# Patient Record
Sex: MALE | Race: ASIAN | NOT HISPANIC OR LATINO | URBAN - METROPOLITAN AREA
[De-identification: names, ages, dates, MRNs, and addresses within clinical notes are randomized per-mention and may not be internally consistent; named-entity substitution may affect disease eponyms.]

---

## 2020-06-17 ENCOUNTER — TELEPHONE ENCOUNTER (OUTPATIENT)
Dept: URBAN - METROPOLITAN AREA CLINIC 98 | Facility: CLINIC | Age: 35
End: 2020-06-17

## 2020-06-17 RX ORDER — MESALAMINE 1.2 G/1
TAKE TWO TABLETS BY MOUTH DAILY TABLET, DELAYED RELEASE ORAL
Qty: 60 | Refills: 5 | Status: ACTIVE | COMMUNITY
Start: 2019-09-19 | End: 1900-01-01

## 2020-06-17 RX ORDER — MERCAPTOPURINE 50 MG/1
TAKE 1 TABLET IN THE MORNING AS DIRECTED TABLET ORAL
Qty: 90 | Refills: 3 | Status: ACTIVE | COMMUNITY
Start: 2019-11-22 | End: 2020-11-16

## 2020-06-17 RX ORDER — MESALAMINE 375 MG/1
TAKE 4 CAPSULES (1,500 MG) BY ORAL ROUTE ONCE DAILY IN THE MORNING FOR 90 DAYS CAPSULE, EXTENDED RELEASE ORAL 1
Qty: 360 | Refills: 3 | Status: ACTIVE | COMMUNITY
Start: 2019-11-22 | End: 2020-11-16

## 2020-06-17 RX ORDER — MESALAMINE 1.2 G/1
2 TABLETS TABLET, DELAYED RELEASE ORAL TWICE DAILY
Qty: 360 | Refills: 3 | OUTPATIENT
Start: 2020-06-17 | End: 2021-06-12

## 2020-07-15 ENCOUNTER — WEB ENCOUNTER (OUTPATIENT)
Dept: URBAN - METROPOLITAN AREA CLINIC 98 | Facility: CLINIC | Age: 35
End: 2020-07-15

## 2020-07-23 ENCOUNTER — OFFICE VISIT (OUTPATIENT)
Dept: URBAN - METROPOLITAN AREA TELEHEALTH 2 | Facility: TELEHEALTH | Age: 35
End: 2020-07-23
Payer: COMMERCIAL

## 2020-07-23 DIAGNOSIS — K51.00 ULCERATIVE PANCOLITIS WITHOUT COMPLICATION: ICD-10-CM

## 2020-07-23 PROCEDURE — G8427 DOCREV CUR MEDS BY ELIG CLIN: HCPCS | Performed by: INTERNAL MEDICINE

## 2020-07-23 PROCEDURE — 99214 OFFICE O/P EST MOD 30 MIN: CPT | Performed by: INTERNAL MEDICINE

## 2020-07-23 PROCEDURE — G8420 CALC BMI NORM PARAMETERS: HCPCS | Performed by: INTERNAL MEDICINE

## 2020-07-23 RX ORDER — MESALAMINE 375 MG/1
TAKE 4 CAPSULES (1,500 MG) BY ORAL ROUTE ONCE DAILY IN THE MORNING FOR 90 DAYS CAPSULE, EXTENDED RELEASE ORAL 1
Qty: 360 | Refills: 3 | Status: ACTIVE | COMMUNITY
Start: 2019-11-22 | End: 2020-11-16

## 2020-07-23 RX ORDER — MERCAPTOPURINE 50 MG/1
TAKE 1 TABLET IN THE MORNING AS DIRECTED TABLET ORAL
Qty: 90 | Refills: 3
Start: 2019-11-22

## 2020-07-23 RX ORDER — MESALAMINE 1.2 G/1
TAKE TWO TABLETS BY MOUTH DAILY TABLET, DELAYED RELEASE ORAL
Qty: 60 | Refills: 5 | Status: DISCONTINUED | COMMUNITY
Start: 2019-09-19

## 2020-07-23 RX ORDER — PREDNISONE 20 MG/1
1 TABLET TABLET ORAL
Qty: 30 | Refills: 0 | OUTPATIENT

## 2020-07-23 RX ORDER — MESALAMINE 1.2 G/1
2 TABLETS TABLET, DELAYED RELEASE ORAL TWICE DAILY
Qty: 360 | Refills: 3
Start: 2020-06-17

## 2020-07-23 RX ORDER — MERCAPTOPURINE 50 MG/1
TAKE 1 TABLET IN THE MORNING AS DIRECTED TABLET ORAL
Qty: 90 | Refills: 3 | Status: ACTIVE | COMMUNITY
Start: 2019-11-22 | End: 2020-11-16

## 2020-07-23 RX ORDER — MESALAMINE 1.2 G/1
2 TABLETS TABLET, DELAYED RELEASE ORAL TWICE DAILY
Qty: 360 | Refills: 3 | Status: ACTIVE | COMMUNITY
Start: 2020-06-17 | End: 2021-06-12

## 2020-07-23 NOTE — HPI-OTHER HISTORIES
Long history of UC On Apriso and 6mp Flaring x 1 month diarrhea, gas urgency slight blood colonoscopy 10/2018 colitis was in remission endoscopically

## 2020-07-30 LAB
A/G RATIO: 1.8
ALBUMIN: 4.9
ALKALINE PHOSPHATASE: 57
ALT (SGPT): 15
AST (SGOT): 18
BILIRUBIN, TOTAL: 0.9
BUN/CREATININE RATIO: 13
BUN: 12
CALCIUM: 9.6
CARBON DIOXIDE, TOTAL: 25
CHLORIDE: 100
CREATININE: 0.94
EGFR IF AFRICN AM: 119
EGFR IF NONAFRICN AM: 103
GLOBULIN, TOTAL: 2.7
GLUCOSE: 111
HEMATOCRIT: 44.8
HEMOGLOBIN: 15.2
MCH: 31.1
MCHC: 33.9
MCV: 92
NRBC: (no result)
PLATELETS: 301
POTASSIUM: 3.8
PROTEIN, TOTAL: 7.6
RBC: 4.89
RDW: 15.2
SODIUM: 142
WBC: 6

## 2020-08-12 LAB — GASTROINTESTINAL PATHOGEN: (no result)

## 2021-08-09 ENCOUNTER — ERX REFILL RESPONSE (OUTPATIENT)
Dept: URBAN - METROPOLITAN AREA CLINIC 111 | Facility: CLINIC | Age: 36
End: 2021-08-09

## 2021-08-09 ENCOUNTER — TELEPHONE ENCOUNTER (OUTPATIENT)
Dept: URBAN - METROPOLITAN AREA CLINIC 98 | Facility: CLINIC | Age: 36
End: 2021-08-09

## 2021-08-09 RX ORDER — MESALAMINE 1.2 G/1
TAKE TWO TABLETS BY MOUTH TWICE A DAY TABLET, DELAYED RELEASE ORAL
Qty: 360 TABLET | Refills: 1 | OUTPATIENT

## 2021-08-09 RX ORDER — MESALAMINE 1.2 G/1
2 TABLETS TABLET, DELAYED RELEASE ORAL TWICE DAILY
Qty: 360 | Refills: 3 | OUTPATIENT

## 2021-08-13 LAB
A/G RATIO: 1.6
ALBUMIN: 4.8
ALKALINE PHOSPHATASE: 73
ALT (SGPT): 23
AST (SGOT): 21
BILIRUBIN, TOTAL: 0.6
BUN/CREATININE RATIO: 15
BUN: 11
C-REACTIVE PROTEIN, QUANT: 7
CALCIUM: 9.1
CARBON DIOXIDE, TOTAL: 24
CHLORIDE: 101
CREATININE: 0.75
EGFR IF AFRICN AM: 135
EGFR IF NONAFRICN AM: 116
GLOBULIN, TOTAL: 3
GLUCOSE: 89
HEMATOCRIT: 44.3
HEMOGLOBIN: 15.2
MCH: 29.9
MCHC: 34.3
MCV: 87
NRBC: (no result)
PLATELETS: 282
POTASSIUM: 4.1
PROTEIN, TOTAL: 7.8
RBC: 5.09
RDW: 13.6
SODIUM: 140
WBC: 3.1

## 2021-09-10 ENCOUNTER — ERX REFILL RESPONSE (OUTPATIENT)
Dept: URBAN - METROPOLITAN AREA CLINIC 111 | Facility: CLINIC | Age: 36
End: 2021-09-10

## 2021-09-10 RX ORDER — MERCAPTOPURINE 50 MG/1
TAKE ONE BY MOUTH EVERY MORNING AS DIRECTED TABLET ORAL
Qty: 30 TABLET | Refills: 1 | OUTPATIENT

## 2021-09-10 RX ORDER — MERCAPTOPURINE 50 MG/1
TAKE 1 TABLET IN THE MORNING AS DIRECTED TABLET ORAL
Qty: 90 | Refills: 3 | OUTPATIENT

## 2022-02-06 ENCOUNTER — ERX REFILL RESPONSE (OUTPATIENT)
Dept: URBAN - METROPOLITAN AREA CLINIC 111 | Facility: CLINIC | Age: 37
End: 2022-02-06

## 2022-02-06 RX ORDER — MESALAMINE 1.2 G/1
TAKE TWO TABLETS BY MOUTH TWICE A DAY TABLET, DELAYED RELEASE ORAL
Qty: 360 TABLET | Refills: 0 | OUTPATIENT

## 2022-02-06 RX ORDER — MESALAMINE 1.2 G/1
TAKE TWO TABLETS BY MOUTH TWICE A DAY TABLET, DELAYED RELEASE ORAL
Qty: 360 TABLET | Refills: 1 | OUTPATIENT

## 2022-03-31 ENCOUNTER — ERX REFILL RESPONSE (OUTPATIENT)
Dept: URBAN - METROPOLITAN AREA CLINIC 111 | Facility: CLINIC | Age: 37
End: 2022-03-31

## 2022-03-31 RX ORDER — MERCAPTOPURINE 50 MG/1
TAKE ONE BY MOUTH EVERY MORNING TABLET ORAL
Qty: 30 TABLET | Refills: 1 | OUTPATIENT

## 2022-03-31 RX ORDER — MERCAPTOPURINE 50 MG/1
TAKE ONE BY MOUTH EVERY MORNING TABLET ORAL
Qty: 30 TABLET | Refills: 0 | OUTPATIENT

## 2022-06-06 ENCOUNTER — ERX REFILL RESPONSE (OUTPATIENT)
Dept: URBAN - METROPOLITAN AREA CLINIC 111 | Facility: CLINIC | Age: 37
End: 2022-06-06

## 2022-06-06 RX ORDER — MERCAPTOPURINE 50 MG/1
TAKE ONE TABLET BY MOUTH EVERY MORNING TABLET ORAL
Qty: 30 TABLET | Refills: 1 | OUTPATIENT

## 2022-06-06 RX ORDER — MERCAPTOPURINE 50 MG/1
TAKE ONE TABLET BY MOUTH EVERY MORNING TABLET ORAL
Qty: 30 TABLET | Refills: 0 | OUTPATIENT

## 2022-06-09 ENCOUNTER — CLAIMS CREATED FROM THE CLAIM WINDOW (OUTPATIENT)
Dept: URBAN - METROPOLITAN AREA TELEHEALTH 2 | Facility: TELEHEALTH | Age: 37
End: 2022-06-09
Payer: COMMERCIAL

## 2022-06-09 DIAGNOSIS — K52.9 IBD (INFLAMMATORY BOWEL DISEASE): ICD-10-CM

## 2022-06-09 DIAGNOSIS — K51.00 ULCERATIVE PANCOLITIS WITHOUT COMPLICATION: ICD-10-CM

## 2022-06-09 PROBLEM — 444548001: Status: ACTIVE | Noted: 2022-06-09

## 2022-06-09 PROCEDURE — 99213 OFFICE O/P EST LOW 20 MIN: CPT | Performed by: INTERNAL MEDICINE

## 2022-06-09 RX ORDER — MESALAMINE 1.2 G/1
TAKE TWO TABLETS BY MOUTH TWICE A DAY TABLET, DELAYED RELEASE ORAL
Qty: 360 TABLET | Refills: 1 | Status: ACTIVE | COMMUNITY

## 2022-06-09 RX ORDER — PREDNISONE 20 MG/1
1 TABLET TABLET ORAL
Qty: 30 | Refills: 0 | Status: ACTIVE | COMMUNITY

## 2022-06-09 RX ORDER — MESALAMINE 1.2 G/1
2 TABLET, DELAYED RELEASE ORAL TWICE DAILY
Qty: 360 | Refills: 1

## 2022-06-09 RX ORDER — MERCAPTOPURINE 50 MG/1
1 TABLET ORAL DAILY
Qty: 90 | Refills: 1

## 2022-06-09 RX ORDER — MERCAPTOPURINE 50 MG/1
TAKE ONE TABLET BY MOUTH EVERY MORNING TABLET ORAL
Qty: 30 TABLET | Refills: 1 | Status: ACTIVE | COMMUNITY

## 2022-06-09 NOTE — HPI-OTHER HISTORIES
Long history of UC On Apriso and 6mp Flaring x 1 month diarrhea, gas urgency slight blood colonoscopy 10/2018 colitis was in remission endoscopically . 6/9/22 diarrhea,mucus past 3 months urgencytaking 6mp 50 mg daily mesalamine 2.4 g daily

## 2022-06-12 ENCOUNTER — WEB ENCOUNTER (OUTPATIENT)
Dept: URBAN - METROPOLITAN AREA CLINIC 98 | Facility: CLINIC | Age: 37
End: 2022-06-12

## 2022-06-13 ENCOUNTER — WEB ENCOUNTER (OUTPATIENT)
Dept: URBAN - METROPOLITAN AREA CLINIC 98 | Facility: CLINIC | Age: 37
End: 2022-06-13

## 2022-06-13 ENCOUNTER — TELEPHONE ENCOUNTER (OUTPATIENT)
Dept: URBAN - METROPOLITAN AREA CLINIC 98 | Facility: CLINIC | Age: 37
End: 2022-06-13

## 2022-06-13 RX ORDER — PREDNISONE 20 MG/1
1 TABLET TABLET ORAL
Qty: 30 | Refills: 0
End: 2022-07-03

## 2022-09-21 ENCOUNTER — OFFICE VISIT (OUTPATIENT)
Dept: URBAN - METROPOLITAN AREA SURGERY CENTER 18 | Facility: SURGERY CENTER | Age: 37
End: 2022-09-21

## 2022-11-30 ENCOUNTER — OFFICE VISIT (OUTPATIENT)
Dept: URBAN - METROPOLITAN AREA SURGERY CENTER 18 | Facility: SURGERY CENTER | Age: 37
End: 2022-11-30
Payer: COMMERCIAL

## 2022-11-30 ENCOUNTER — CLAIMS CREATED FROM THE CLAIM WINDOW (OUTPATIENT)
Dept: URBAN - METROPOLITAN AREA CLINIC 4 | Facility: CLINIC | Age: 37
End: 2022-11-30
Payer: COMMERCIAL

## 2022-11-30 DIAGNOSIS — K51.50 CHRONIC LEFT-SIDED ULCERATIVE COLITIS: ICD-10-CM

## 2022-11-30 DIAGNOSIS — K63.89 OTHER SPECIFIED DISEASES OF INTESTINE: ICD-10-CM

## 2022-11-30 PROCEDURE — 88305 TISSUE EXAM BY PATHOLOGIST: CPT | Performed by: PATHOLOGY

## 2022-11-30 PROCEDURE — 45380 COLONOSCOPY AND BIOPSY: CPT | Performed by: INTERNAL MEDICINE

## 2022-11-30 PROCEDURE — G8907 PT DOC NO EVENTS ON DISCHARG: HCPCS | Performed by: INTERNAL MEDICINE

## 2022-12-22 ENCOUNTER — ERX REFILL RESPONSE (OUTPATIENT)
Dept: URBAN - METROPOLITAN AREA CLINIC 111 | Facility: CLINIC | Age: 37
End: 2022-12-22

## 2022-12-22 RX ORDER — MERCAPTOPURINE 50 MG/1
TAKE ONE TABLET BY MOUTH DAILY TABLET ORAL
Qty: 90 TABLET | Refills: 0 | OUTPATIENT

## 2022-12-22 RX ORDER — MERCAPTOPURINE 50 MG/1
1 TABLET ORAL DAILY
Qty: 90 | Refills: 1 | OUTPATIENT

## 2023-01-21 LAB
A/G RATIO: 1.9
ALBUMIN: 4.9
ALKALINE PHOSPHATASE: 67
ALT (SGPT): 20
AST (SGOT): 22
BILIRUBIN, TOTAL: 1.2
BUN/CREATININE RATIO: 13
BUN: 9
CALCIUM: 9.3
CARBON DIOXIDE, TOTAL: 22
CHLORIDE: 102
CREATININE: 0.71
EGFR: 120
GLOBULIN, TOTAL: 2.6
GLUCOSE: 80
HEMATOCRIT: 42.5
HEMOGLOBIN: 14.9
MCH: 34
MCHC: 35.1
MCV: 97
NRBC: (no result)
PLATELETS: 308
POTASSIUM: 4.3
PROTEIN, TOTAL: 7.5
RBC: 4.38
RDW: 15.4
SODIUM: 141
WBC: 3.8

## 2023-04-03 ENCOUNTER — WEB ENCOUNTER (OUTPATIENT)
Dept: URBAN - METROPOLITAN AREA CLINIC 98 | Facility: CLINIC | Age: 38
End: 2023-04-03

## 2023-04-05 ENCOUNTER — WEB ENCOUNTER (OUTPATIENT)
Dept: URBAN - METROPOLITAN AREA CLINIC 98 | Facility: CLINIC | Age: 38
End: 2023-04-05

## 2023-04-05 RX ORDER — MESALAMINE 1.2 G/1
2 TABLET, DELAYED RELEASE ORAL TWICE DAILY
Qty: 360 | Refills: 1
End: 2023-10-02

## 2023-04-05 RX ORDER — MERCAPTOPURINE 50 MG/1
TAKE ONE TABLET BY MOUTH DAILY TABLET ORAL
Qty: 90 TABLET | Refills: 0

## 2023-04-13 ENCOUNTER — TELEPHONE ENCOUNTER (OUTPATIENT)
Dept: URBAN - METROPOLITAN AREA CLINIC 35 | Facility: CLINIC | Age: 38
End: 2023-04-13

## 2023-04-13 ENCOUNTER — OFFICE VISIT (OUTPATIENT)
Dept: URBAN - METROPOLITAN AREA TELEHEALTH 2 | Facility: TELEHEALTH | Age: 38
End: 2023-04-13
Payer: COMMERCIAL

## 2023-04-13 DIAGNOSIS — K51.00 ULCERATIVE PANCOLITIS WITHOUT COMPLICATION: ICD-10-CM

## 2023-04-13 PROCEDURE — 99213 OFFICE O/P EST LOW 20 MIN: CPT | Performed by: INTERNAL MEDICINE

## 2023-04-13 RX ORDER — MERCAPTOPURINE 50 MG/1
TAKE ONE TABLET BY MOUTH DAILY TABLET ORAL
Qty: 90 TABLET | Refills: 0 | Status: ACTIVE | COMMUNITY

## 2023-04-13 RX ORDER — MESALAMINE 1.2 G/1
2 TABLET, DELAYED RELEASE ORAL TWICE DAILY
Qty: 360 | Refills: 1 | Status: ACTIVE | COMMUNITY
End: 2023-10-02

## 2023-04-13 NOTE — HPI-OTHER HISTORIES
Long history of UC On Apriso and 6mp. Flaring x 1 month diarrhea, gas urgency slight blood colonoscopy 10/2018 colitis was in remission endoscopically . 6/9/22 diarrhea,mucus past 3 months urgencytaking 6mp 50 mg daily mesalamine 2.4 g daily colonoscopy 11/22 in remission, very minimal activity in the rectum

## 2023-05-09 ENCOUNTER — WEB ENCOUNTER (OUTPATIENT)
Dept: URBAN - METROPOLITAN AREA CLINIC 98 | Facility: CLINIC | Age: 38
End: 2023-05-09

## 2023-05-09 RX ORDER — MESALAMINE 1.2 G/1
2 TABLET, DELAYED RELEASE ORAL TWICE DAILY
Qty: 360 | Refills: 1
End: 2023-11-05

## 2023-07-07 ENCOUNTER — WEB ENCOUNTER (OUTPATIENT)
Dept: URBAN - METROPOLITAN AREA CLINIC 98 | Facility: CLINIC | Age: 38
End: 2023-07-07

## 2023-07-07 RX ORDER — MERCAPTOPURINE 50 MG/1
TAKE ONE TABLET BY MOUTH DAILY TABLET ORAL
Qty: 90 TABLET | Refills: 3

## 2024-02-01 ENCOUNTER — OV EP (OUTPATIENT)
Dept: URBAN - METROPOLITAN AREA CLINIC 98 | Facility: CLINIC | Age: 39
End: 2024-02-01
Payer: COMMERCIAL

## 2024-02-01 VITALS
WEIGHT: 165 LBS | DIASTOLIC BLOOD PRESSURE: 84 MMHG | BODY MASS INDEX: 24.44 KG/M2 | HEIGHT: 69 IN | TEMPERATURE: 97.3 F | HEART RATE: 79 BPM | SYSTOLIC BLOOD PRESSURE: 122 MMHG

## 2024-02-01 DIAGNOSIS — K58.8 OTHER IRRITABLE BOWEL SYNDROME: ICD-10-CM

## 2024-02-01 DIAGNOSIS — L50.9 URTICARIA: ICD-10-CM

## 2024-02-01 PROCEDURE — 99214 OFFICE O/P EST MOD 30 MIN: CPT | Performed by: INTERNAL MEDICINE

## 2024-02-01 RX ORDER — MERCAPTOPURINE 50 MG/1
TAKE ONE TABLET BY MOUTH DAILY TABLET ORAL
Qty: 90 TABLET | Refills: 3 | Status: ACTIVE | COMMUNITY

## 2024-02-01 NOTE — HPI-OTHER HISTORIES
Long history of UC On Apriso and 6mp. Flaring x 1 month diarrhea, gas urgency slight blood colonoscopy 10/2018 colitis was in remission endoscopically . 6/9/22 diarrhea,mucus past 3 months urgencytaking 6mp 50 mg daily mesalamine 2.4 g daily colonoscopy 11/22 in remission, very minimal activity in the rectum . 2/1/24 colitis doing well no diarrhea, blood or mucus getting transient hives every morning

## 2024-02-04 LAB
A/G RATIO: 1.7
ALBUMIN: 4.6
ALKALINE PHOSPHATASE: 56
ALT (SGPT): 24
AST (SGOT): 22
BILIRUBIN, TOTAL: 0.8
BUN/CREATININE RATIO: (no result)
BUN: 12
C-REACTIVE PROTEIN, QUANT: 0.9
CALCIUM: 9.2
CARBON DIOXIDE, TOTAL: 28
CHLORIDE: 103
CREATININE: 0.72
EGFR: 118
GLOBULIN, TOTAL: 2.7
GLUCOSE: 93
HEMATOCRIT: 42.8
HEMOGLOBIN: 14.9
MCH: 32.8
MCHC: 34.8
MCV: 94.3
MPV: 10
PLATELET COUNT: 312
POTASSIUM: 4.3
PROTEIN, TOTAL: 7.3
RDW: 14.3
RED BLOOD CELL COUNT: 4.54
SED RATE BY MODIFIED: 2
SODIUM: 139
WHITE BLOOD CELL COUNT: 3.6

## 2024-09-05 ENCOUNTER — WEB ENCOUNTER (OUTPATIENT)
Dept: URBAN - METROPOLITAN AREA CLINIC 98 | Facility: CLINIC | Age: 39
End: 2024-09-05

## 2024-09-05 RX ORDER — MERCAPTOPURINE 50 MG/1
TAKE ONE TABLET BY MOUTH DAILY TABLET ORAL DAILY
Qty: 90 | Refills: 3

## 2024-09-05 RX ORDER — MESALAMINE 1.2 G/1
2 TABLETS WITH A MEAL TABLET, DELAYED RELEASE ORAL TWICE DAILY
Qty: 360 TABLET | Refills: 3